# Patient Record
Sex: MALE | Race: WHITE
[De-identification: names, ages, dates, MRNs, and addresses within clinical notes are randomized per-mention and may not be internally consistent; named-entity substitution may affect disease eponyms.]

---

## 2018-12-28 ENCOUNTER — TRANSCRIPTION ENCOUNTER (OUTPATIENT)
Age: 43
End: 2018-12-28

## 2021-04-18 ENCOUNTER — TRANSCRIPTION ENCOUNTER (OUTPATIENT)
Age: 46
End: 2021-04-18

## 2021-10-03 ENCOUNTER — EMERGENCY (EMERGENCY)
Facility: HOSPITAL | Age: 46
LOS: 0 days | Discharge: HOME | End: 2021-10-03
Attending: STUDENT IN AN ORGANIZED HEALTH CARE EDUCATION/TRAINING PROGRAM
Payer: COMMERCIAL

## 2021-10-03 VITALS
SYSTOLIC BLOOD PRESSURE: 121 MMHG | DIASTOLIC BLOOD PRESSURE: 80 MMHG | RESPIRATION RATE: 16 BRPM | HEART RATE: 70 BPM | TEMPERATURE: 98 F | OXYGEN SATURATION: 98 %

## 2021-10-03 VITALS
HEART RATE: 68 BPM | RESPIRATION RATE: 15 BRPM | SYSTOLIC BLOOD PRESSURE: 118 MMHG | DIASTOLIC BLOOD PRESSURE: 71 MMHG | OXYGEN SATURATION: 97 % | TEMPERATURE: 98 F
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
## 2021-10-03 DIAGNOSIS — M25.572 PAIN IN LEFT ANKLE AND JOINTS OF LEFT FOOT: ICD-10-CM

## 2021-10-03 DIAGNOSIS — S92.102A UNSPECIFIED FRACTURE OF LEFT TALUS, INITIAL ENCOUNTER FOR CLOSED FRACTURE: ICD-10-CM

## 2021-10-03 PROCEDURE — 73590 X-RAY EXAM OF LOWER LEG: CPT | Mod: 26,LT

## 2021-10-03 PROCEDURE — 73610 X-RAY EXAM OF ANKLE: CPT | Mod: LT

## 2021-10-03 PROCEDURE — 73630 X-RAY EXAM OF FOOT: CPT | Mod: LT

## 2021-10-03 PROCEDURE — 73590 X-RAY EXAM OF LOWER LEG: CPT | Mod: LT

## 2021-10-03 PROCEDURE — 73630 X-RAY EXAM OF FOOT: CPT | Mod: 26,LT

## 2021-10-03 PROCEDURE — 73610 X-RAY EXAM OF ANKLE: CPT | Mod: 26,LT

## 2021-10-03 PROCEDURE — 99284 EMERGENCY DEPT VISIT MOD MDM: CPT | Mod: 25

## 2021-10-03 PROCEDURE — 73600 X-RAY EXAM OF ANKLE: CPT | Mod: LT

## 2021-10-03 PROCEDURE — 99284 EMERGENCY DEPT VISIT MOD MDM: CPT

## 2021-10-03 RX ORDER — OXYCODONE HYDROCHLORIDE 5 MG/1
1 TABLET ORAL
Qty: 8 | Refills: 0
Start: 2021-10-03 | End: 2021-10-04

## 2021-10-03 RX ORDER — ACETAMINOPHEN 500 MG
975 TABLET ORAL ONCE
Refills: 0 | Status: COMPLETED | OUTPATIENT
Start: 2021-10-03 | End: 2021-10-03

## 2021-10-03 RX ADMIN — Medication 975 MILLIGRAM(S): at 15:07

## 2021-10-03 NOTE — ED STATDOCS - NSICDXPASTMEDICALHX_GEN_ALL_CORE_FT
PAST MEDICAL HISTORY:  Anxiety     Hernia right inguinal hernia in 1996    Hernia umbilical hernia in November 2012    Lipoma abdominal lipoma in November 2012

## 2021-10-03 NOTE — ED STATDOCS - CARE PROVIDER_API CALL
Juan Carlos Piedra (DO)  Orthopaedic Surgery  [address] [place] [address]  Phone: [phone]  Fax: [phone]  Follow Up Time:

## 2021-10-03 NOTE — ED STATDOCS - PROGRESS NOTE DETAILS
Seen ,examined and treated by Orthopedic Resident in ED. Posterior splint applied to left lower leg, Crutch Walking. Agreed to F/U with Dr. Piedra in 1 week and take Aspirin 325 mg. PO daily. MTangredi NP 46 yr. old male presents to ED with left ankle injury while playing Tennis today. Reports he rolled his ankle. Pain on ambulation. Seen and examined by attending in intake. Plan: X-ray of left foot and left ankle, pain control and re evaluate. Gerson SILVERIO

## 2021-10-03 NOTE — ED STATDOCS - CLINICAL SUMMARY MEDICAL DECISION MAKING FREE TEXT BOX
Ailyn LISA: 45 y/o M with a PMHx of anxiety, hernia, lipoma presents to the ED c/o L ankle pain while playing tennis this morning. States that he rolled his R ankle. No head strike. States that he immediately iced and elevated the L ankle after the injury. Unable to bear weight on L foot. No other injuries. Took 3 Advil PTA. Endorsing he would like pain meds. exam vss non toxic PE as above ddx likely msk sprain/strain ortho consult as indicated, reassess.

## 2021-10-03 NOTE — ED STATDOCS - PHYSICAL EXAMINATION
VITALS: Initial triage and subsequent vitals have been reviewed by me.  GEN APPEARANCE: WDWN, alert and cooperative, non-toxic appearing and in NAD  HEAD: Atraumatic, normocephalic   EYES: PERRLa, EOMI, vision grossly intact.   EARS: Gross hearing intact.   NOSE: No nasal discharge, no external evidence of epistaxis.   NECK: Supple  CV: RRR, S1S2, no c/r/m/g. No cyanosis or pallor. Extremities warm, well perfused. Cap refill <2 seconds. No bruits.   LUNGS: CTAB. No wheezing. No rales. No rhonchi. No diminished breath sounds.   ABDOMEN: Soft, NTND. No guarding or rebound. No masses.   MSK/EXT: Spine appears normal, no spine point tenderness. No CVA ttp. Normal muscular development. Pelvis stable. No obvious joint or bony deformity, no peripheral edema. (+) Distal L LE edematous, tender; L lateral malleolus TTP, distally neurovascularly intact   NEURO: Alert, follows commands.  Speech normal. Sensation and motor normal x4 extremities.   SKIN: Normal color for race, warm, dry and intact. No evidence of rash.  PSYCH: Normal mood and affect.

## 2021-10-03 NOTE — ED STATDOCS - PATIENT PORTAL LINK FT
You can access the FollowMyHealth Patient Portal offered by [facility] by registering at the following website: [email or web address]. By joining [employer]’s FollowMyHealth portal, you will also be able to view your health information using other applications (apps) compatible with our system.

## 2021-10-03 NOTE — ED STATDOCS - ATTENDING CONTRIBUTION TO CARE
Ailyn LISA: I performed the initial face to face bedside interview with this patient regarding history of present illness, review of symptoms and relevant past medical, social and family history.  I completed an independent physical examination.  I was the initial provider who evaluated this patient. I have signed out the follow up of any pending tests (i.e. labs, radiological studies) to the ACP.  I have communicated the patient’s plan of care and disposition with the ACP.  The history, relevant review of systems, past medical and surgical history, medical decision making, and physical examination was documented by the scribe in my presence and I attest to the accuracy of the documentation.

## 2021-10-03 NOTE — ED STATDOCS - NSICDXPASTSURGICALHX_GEN_ALL_CORE_FT
PAST SURGICAL HISTORY:  Fracture 1992 nose reconstruction due to trauma    Hernia repair of right inguinal hernia with ? mesh

## 2021-10-03 NOTE — CONSULT NOTE ADULT - SUBJECTIVE AND OBJECTIVE BOX
46yMale c/o L ankle pain s/p mechanical fall. Pt states he rolled his ankle inverting it while playing tennis. Denies headstrike/LOC. Denies any other trauma or injuries. Denies numbness/tingling. Community ambulator at baseline. Was unable to ambulate after fall.     PAST MEDICAL & SURGICAL HISTORY:  Hernia  umbilical hernia in November 2012    Lipoma  abdominal lipoma in November 2012    Hernia  right inguinal hernia in 1996    Anxiety    Hernia  repair of right inguinal hernia with ? mesh    Fracture  1992 nose reconstruction due to trauma      Home Medications:    Allergies    No Known Allergies    Intolerances        Vital Signs Last 24 Hrs  T(C): 36.9 (03 Oct 2021 15:01), Max: 36.9 (03 Oct 2021 13:09)  T(F): 98.5 (03 Oct 2021 15:01), Max: 98.5 (03 Oct 2021 13:09)  HR: 70 (03 Oct 2021 15:01) (68 - 70)  BP: 121/80 (03 Oct 2021 15:01) (118/71 - 121/80)  BP(mean): 84 (03 Oct 2021 13:09) (84 - 84)  RR: 16 (03 Oct 2021 15:01) (15 - 16)  SpO2: 98% (03 Oct 2021 15:01) (97% - 98%)  Physical Exam  Gen: NAD, resting comfortably  LLE  Skin intact  TTP over lateral ankle  ROM limited due to pain  +TA/EHL/FHL/GS  SILT L2-S1  DP/PT 2+  Compartments soft and compressible    RUE: No bony tenderness or deformity, skin intact  LUE: No bony tenderness or deformity, skin intact  RLE: No bony tenderness or deformity, skin intact  Spine: No tenderness or stepoffs, skin intact                    Imaging:  XR Ankle: shows medial talus osteochondral defect    Procedure: Patient neurovascularly intact pre-procedure. Placement of a well padded trilam splint. Patient tolerated the procedure well. Post procedure imaging obtained and showed improved alignment. Post procedure the patient was NV intact.    A/P: 46yMale with left ankle osteochondral defect of medial talus    - Pain control  - NWB LLE in splint  - crutches  - aspirin daily  - Keep splint clean, dry and intact until follow up  - Rest ice elevate  - pt aware to FU outpatient for MRI and continued management   - Follow up with Dr. Piedra in office this week please call office for appointment  - Will discuss plan with attending and advise of any changes

## 2021-10-03 NOTE — ED STATDOCS - NSFOLLOWUPINSTRUCTIONS_ED_ALL_ED_FT
Talar Fracture in Adults    WHAT YOU NEED TO KNOW:    A talar fracture is a break in the talus bone of your foot. The talus bone is a square, flat bone on top of the calcaneus (heel bone). It connects the calcaneus with the tibia and fibula (lower leg bones) to form the ankle.    DISCHARGE INSTRUCTIONS:    Call your local emergency number (911 in the [place]) if:   •You suddenly feel lightheaded and short of breath.      •You have chest pain when you take a deep breath or cough.      •You cough up blood.      Return to the emergency department if:   •You have severe pain.      •Your cast breaks or gets damaged.      •Your toes are numb, swollen, cold, or pale.      •Your leg feels warm, tender, and painful. It may look swollen and red.      Call your doctor if:   •You have a fever.      •You have new blood stains or a bad smell coming from under your cast.      •You have increased pain or swelling, even after treatment.      •You have questions or concerns about your condition or care.      Medicines: You may need any of the following:   •Prescription pain medicine may be given. Ask your healthcare provider how to take this medicine safely. Some prescription pain medicines contain acetaminophen. Do not take other medicines that contain acetaminophen without talking to your healthcare provider. Too much acetaminophen may cause liver damage. Prescription pain medicine may cause constipation. Ask your healthcare provider how to prevent or treat constipation.       •Antibiotics prevent or fight a bacterial infection.      •Take your medicine as directed. Contact your healthcare provider if you think your medicine is not helping or if you have side effects. Tell him of her if you are allergic to any medicine. Keep a list of the medicines, vitamins, and herbs you take. Include the amounts, and when and why you take them. Bring the list or the pill bottles to follow-up visits. Carry your medicine list with you in case of an emergency.      Self-care:   •Rest your ankle as much as possible. Return to normal activities as directed.      •Apply ice to decrease swelling and pain, and to prevent tissue damage. Use an ice pack, or put crushed ice in a plastic bag. Cover it with a towel before you apply it. Apply ice for 15 to 20 minutes every hour or as directed.      •Elevate your ankle above the level of your heart as often as you can. This will help decrease swelling and pain. Prop your leg on pillows or blankets to keep your ankle elevated comfortably.  Elevate Leg           Support devices such as casts and removable boots prevent ankle movement and help your talar fracture heal. A support device may be the only treatment you need. Crutches can help you move around until the bone heals.    Follow up with your doctor as directed: Write down your questions so you remember to ask them during your visits.    Rest. Ice compresses and elevate.  Ice compresses for 20 min. intervals.  Ibuprofen 600 mg. PO every 6 hrs. for pain.  Take aspirin 325 mg. PO daily.  Follow up with Dr. Piedra in 1 week Call tomorrow.

## 2021-10-03 NOTE — ED STATDOCS - OBJECTIVE STATEMENT
45 y/o M with a PMHx of anxiety, hernia, lipoma presents to the ED c/o L ankle pain while playing tennis this morning. States that he rolled his R ankle. No head strike. States that he immediately iced and elevated the L ankle after the injury. Unable to bear weight on L foot. No other injuries. Took 3 Advil PTA. Endorsing he would like pain meds. Does not take any daily meds. 47 y/o M with a PMHx of anxiety, hernia, lipoma presents to the ED c/o L ankle pain while playing tennis this morning. States that he rolled his R ankle. No head strike. States that he immediately iced and elevated the L ankle after the injury. Unable to bear weight on L foot. No other injuries. Took 3 Advil PTA. Endorsing he would like pain meds. Does not take any daily meds. Denies n/v/f/c/cp/sob. Denies headache, syncope, lightheadedness, dizziness. Denies chest palpitations, abdominal pain. Denies edema. Denies dysuria, hematuria, BRBPR, tarry stools, diarrhea, constipation.

## 2021-10-04 ENCOUNTER — NON-APPOINTMENT (OUTPATIENT)
Age: 46
End: 2021-10-04

## 2021-10-04 ENCOUNTER — APPOINTMENT (OUTPATIENT)
Dept: ORTHOPEDIC SURGERY | Facility: CLINIC | Age: 46
End: 2021-10-04
Payer: COMMERCIAL

## 2021-10-04 PROCEDURE — 97760 ORTHOTIC MGMT&TRAING 1ST ENC: CPT

## 2021-10-04 PROCEDURE — 99204 OFFICE O/P NEW MOD 45 MIN: CPT

## 2021-10-04 RX ORDER — OXYCODONE AND ACETAMINOPHEN 5; 325 MG/1; MG/1
5-325 TABLET ORAL
Qty: 40 | Refills: 0 | Status: ACTIVE | COMMUNITY
Start: 2021-10-04 | End: 1900-01-01

## 2021-10-04 NOTE — PHYSICAL EXAM
[de-identified] : Left ankle Physical Examination:\par \par General: Alert and oriented x3.  In no acute distress.  Pleasant in nature with a normal affect.  No apparent respiratory distress. \par Erythema, Warmth, Rubor: Negative\par Swelling: Positive\par \par ROM:\par 1. Dorsiflexion: 10 degrees\par 2. Plantarflexion: 40 degrees\par 3. Inversion: 10 degrees\par 4. Eversion: 10 degrees\par \par Tenderness to Palpation: \par 1. Lateral Malleolus: Positive\par 2. Medial Malleolus: Negative\par 3. Proximal Fibular Pain: Negative\par 4. Heel Pain: Negative\par 5. Cuboid: Negative\par 6. Navicular: Negative\par 7. Tibiotalar Joint: Positive\par 8. Subtalar Joint: Negative\par 9. Posterior Recess: Negative\par \par Tendon Pain:\par 1. Achilles: Negative\par 2. Peroneals: Negative\par 3. Posterior Tibialis: Negative\par 4. Tibialis Anterior: Negative\par \par Ligament Pain:\par 1. ATFL: Positive\par 2. CFL: Negative \par 3. PTFL: Negative\par 4. Deltoid Ligaments: Negative\par 5. Lis Franc Ligament: Negative\par \par Stability: \par 1. Anterior Drawer: Negative\par 2. Posterior Drawer: Negative\par \par Strength: 5/5 TA/GS/EHL\par \par Pulses: 2+ DP/PT Pulses\par \par Neuro: Intact motor and sensory\par \par Additional Test:\par 1. Calcaneal Squeeze Test: Negative\par 2. Syndesmosis Squeeze Test: Negative [de-identified] : EXAM: XR ANKLE POST RED 2 VIEWS LT\par \par EXAM: XR FOOT COMP MIN 3 VIEWS LT\par \par EXAM: XR ANKLE COMP MIN 3 VIEWS LT\par \par EXAM: XR TIB FIB AP LAT 2 VIEWS LT\par \par \par PROCEDURE DATE: 10/03/2021\par \par \par \par INTERPRETATION: Left tib-fib, ankle, and foot with follow-up left ankle. Concern is fracture.\par \par Left tib-fib. 4 views.\par \par No knee effusion. Knee unremarkable.\par \par Tib-fib appear intact.\par \par Left ankle. 3 views.\par \par There is osteochondritis desiccated and of the medial outer tibial dome. No stated fracture.\par \par Left foot. 3 views.\par \par Bones intact.\par \par Follow-up left ankle. 3 views. 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 Follow-up ankle splinting.\par \par --- End of Report ---\par \par \par \par \par \par ANTONETTE LANE MD; Attending Radiologist\par This document has been electronically signed. Oct 3 2021 4:25PM

## 2021-10-04 NOTE — ADDENDUM
[FreeTextEntry1] : I, Christine Blake, acted solely as a scribe for Dr. Juan Carlos Piedra on this date 10/04/2021.\par \par All medical record entries made by the Scribe were at my, Dr. Juan Carlos Piedra, direction and personally dictated by me on 10/04/2021 . I have reviewed the chart and agree that the record accurately reflects my personal performance of the history, physical exam, assessment and plan. I have also personally directed, reviewed, and agreed with the chart.	\par

## 2021-10-04 NOTE — HISTORY OF PRESENT ILLNESS
[FreeTextEntry1] : 10/4/2021: The patient is a 46-year-old male who presents with a left ankle injury after he sustained an injury while playing tennis this past weekend.  He went to [facility] yesterday where they took x-rays and diagnosed him with a possible talus fracture versus osteochondritis dissecans of the talus.  The patient does have swelling of the ankle and pain in the ankle.  Most of the pain is on the lateral aspect of the ankle but he also has pain over the joint line as well.  He presents using crutches and was in a splint from the ER.  He has no other complaints.  Pain scale is a 7 out of 10.

## 2021-10-04 NOTE — DISCUSSION/SUMMARY
[de-identified] : Today I had a lengthy discussion with the patient regarding their left ankle injury. I have addressed all the patient's concerns surrounding the pathology of their condition. XR  results were reviewed with the patient. In order to provide the patient with a better understanding of their ailment, I educated them about the anatomy, physiology and lifespan of their condition using a foot model. \par \par I recommended that the patient utilize a CAM boot. The patient was fitted for the CAM boot in the office today. The patient was educated about the boot wear pattern and utilization, as well as the timeframe to come out of the boot. He was also given full instructions for using the boot. I would like the patient to remain nonweightbearing in the boot. I advised the patient to utilize 325 mg of Aspirin as instructed for blood thinning purposes. Continue with crutches for ambulation assistance. \par 		\par At this time I would like to obtain advanced imaging of the patient's left ankle. An MRI was ordered so I can find out more about the etiology of the patient's condition. The patient should follow up with the office after obtaining the MRI. The patient understood and verbally agreed to the treatment plan. All of their questions were answered and they were satisfied with the visit. The patient should call the office if they have any questions or experience worsening symptoms.

## 2021-10-07 ENCOUNTER — APPOINTMENT (OUTPATIENT)
Dept: MRI IMAGING | Facility: HOSPITAL | Age: 46
End: 2021-10-07

## 2021-10-11 ENCOUNTER — OUTPATIENT (OUTPATIENT)
Dept: OUTPATIENT SERVICES | Facility: HOSPITAL | Age: 46
LOS: 1 days | End: 2021-10-11
Payer: COMMERCIAL

## 2021-10-11 ENCOUNTER — APPOINTMENT (OUTPATIENT)
Dept: MRI IMAGING | Facility: HOSPITAL | Age: 46
End: 2021-10-11
Payer: COMMERCIAL

## 2021-10-11 DIAGNOSIS — Z00.8 ENCOUNTER FOR OTHER GENERAL EXAMINATION: ICD-10-CM

## 2021-10-11 DIAGNOSIS — M25.472 EFFUSION, LEFT ANKLE: ICD-10-CM

## 2021-10-11 DIAGNOSIS — M25.572 PAIN IN LEFT ANKLE AND JOINTS OF LEFT FOOT: ICD-10-CM

## 2021-10-11 PROCEDURE — 73721 MRI JNT OF LWR EXTRE W/O DYE: CPT | Mod: 26,LT

## 2021-10-11 PROCEDURE — 73721 MRI JNT OF LWR EXTRE W/O DYE: CPT

## 2021-10-15 ENCOUNTER — APPOINTMENT (OUTPATIENT)
Dept: ORTHOPEDIC SURGERY | Facility: CLINIC | Age: 46
End: 2021-10-15
Payer: COMMERCIAL

## 2021-10-15 DIAGNOSIS — M25.572 PAIN IN LEFT ANKLE AND JOINTS OF LEFT FOOT: ICD-10-CM

## 2021-10-15 DIAGNOSIS — S99.912A UNSPECIFIED INJURY OF LEFT ANKLE, INITIAL ENCOUNTER: ICD-10-CM

## 2021-10-15 DIAGNOSIS — M25.472 EFFUSION, LEFT ANKLE: ICD-10-CM

## 2021-10-15 PROCEDURE — 99442: CPT

## 2023-05-22 NOTE — ED STATDOCS - DOMESTIC TRAVEL HIGH RISK QUESTION
Patient lives alone at home, was turning to sit on couch, when ankle twisted and she fell.  Patient denies hitting head or loss of consciousness. Patient does not take blood thinners.     No

## 2024-01-17 ENCOUNTER — NON-APPOINTMENT (OUTPATIENT)
Age: 49
End: 2024-01-17

## 2024-01-19 ENCOUNTER — NON-APPOINTMENT (OUTPATIENT)
Age: 49
End: 2024-01-19

## 2024-01-19 ENCOUNTER — APPOINTMENT (OUTPATIENT)
Dept: OPHTHALMOLOGY | Facility: CLINIC | Age: 49
End: 2024-01-19
Payer: COMMERCIAL

## 2024-01-19 PROCEDURE — 92014 COMPRE OPH EXAM EST PT 1/>: CPT

## 2024-02-04 ENCOUNTER — NON-APPOINTMENT (OUTPATIENT)
Age: 49
End: 2024-02-04

## 2024-10-01 ENCOUNTER — NON-APPOINTMENT (OUTPATIENT)
Age: 49
End: 2024-10-01